# Patient Record
Sex: FEMALE | Race: WHITE | NOT HISPANIC OR LATINO | URBAN - METROPOLITAN AREA
[De-identification: names, ages, dates, MRNs, and addresses within clinical notes are randomized per-mention and may not be internally consistent; named-entity substitution may affect disease eponyms.]

---

## 2021-03-12 ENCOUNTER — HOSPITAL ENCOUNTER (OUTPATIENT)
Dept: LAB | Facility: MEDICAL CENTER | Age: 41
End: 2021-03-12
Attending: PHYSICIAN ASSISTANT
Payer: COMMERCIAL

## 2021-03-12 ENCOUNTER — OFFICE VISIT (OUTPATIENT)
Dept: MEDICAL GROUP | Facility: IMAGING CENTER | Age: 41
End: 2021-03-12
Payer: COMMERCIAL

## 2021-03-12 VITALS
HEART RATE: 74 BPM | BODY MASS INDEX: 22.65 KG/M2 | SYSTOLIC BLOOD PRESSURE: 118 MMHG | OXYGEN SATURATION: 95 % | DIASTOLIC BLOOD PRESSURE: 70 MMHG | WEIGHT: 161.8 LBS | RESPIRATION RATE: 12 BRPM | TEMPERATURE: 98.1 F | HEIGHT: 71 IN

## 2021-03-12 DIAGNOSIS — Z71.89 PRENATAL CONSULT: ICD-10-CM

## 2021-03-12 DIAGNOSIS — Z12.31 ENCOUNTER FOR SCREENING MAMMOGRAM FOR MALIGNANT NEOPLASM OF BREAST: ICD-10-CM

## 2021-03-12 DIAGNOSIS — Z76.89 ENCOUNTER TO ESTABLISH CARE: ICD-10-CM

## 2021-03-12 DIAGNOSIS — Z88.9 H/O SEASONAL ALLERGIES: ICD-10-CM

## 2021-03-12 DIAGNOSIS — E01.0 THYROMEGALY: ICD-10-CM

## 2021-03-12 PROBLEM — Z83.49 FAMILY HISTORY OF HEMOCHROMATOSIS: Status: ACTIVE | Noted: 2018-05-24

## 2021-03-12 LAB
ALBUMIN SERPL BCP-MCNC: 4.4 G/DL (ref 3.2–4.9)
ALBUMIN/GLOB SERPL: 1.6 G/DL
ALP SERPL-CCNC: 76 U/L (ref 30–99)
ALT SERPL-CCNC: 15 U/L (ref 2–50)
ANION GAP SERPL CALC-SCNC: 10 MMOL/L (ref 7–16)
AST SERPL-CCNC: 21 U/L (ref 12–45)
BASOPHILS # BLD AUTO: 0.7 % (ref 0–1.8)
BASOPHILS # BLD: 0.04 K/UL (ref 0–0.12)
BILIRUB SERPL-MCNC: 0.6 MG/DL (ref 0.1–1.5)
BUN SERPL-MCNC: 16 MG/DL (ref 8–22)
CALCIUM SERPL-MCNC: 9.8 MG/DL (ref 8.5–10.5)
CHLORIDE SERPL-SCNC: 105 MMOL/L (ref 96–112)
CHOLEST SERPL-MCNC: 175 MG/DL (ref 100–199)
CO2 SERPL-SCNC: 24 MMOL/L (ref 20–33)
CREAT SERPL-MCNC: 0.58 MG/DL (ref 0.5–1.4)
EOSINOPHIL # BLD AUTO: 0.05 K/UL (ref 0–0.51)
EOSINOPHIL NFR BLD: 0.9 % (ref 0–6.9)
ERYTHROCYTE [DISTWIDTH] IN BLOOD BY AUTOMATED COUNT: 41.4 FL (ref 35.9–50)
EST. AVERAGE GLUCOSE BLD GHB EST-MCNC: 100 MG/DL
FASTING STATUS PATIENT QL REPORTED: NORMAL
GLOBULIN SER CALC-MCNC: 2.7 G/DL (ref 1.9–3.5)
GLUCOSE SERPL-MCNC: 79 MG/DL (ref 65–99)
HBA1C MFR BLD: 5.1 % (ref 4–5.6)
HCT VFR BLD AUTO: 47.5 % (ref 37–47)
HDLC SERPL-MCNC: 83 MG/DL
HGB BLD-MCNC: 15.8 G/DL (ref 12–16)
IMM GRANULOCYTES # BLD AUTO: 0.01 K/UL (ref 0–0.11)
IMM GRANULOCYTES NFR BLD AUTO: 0.2 % (ref 0–0.9)
LDLC SERPL CALC-MCNC: 81 MG/DL
LYMPHOCYTES # BLD AUTO: 1.81 K/UL (ref 1–4.8)
LYMPHOCYTES NFR BLD: 32.1 % (ref 22–41)
MCH RBC QN AUTO: 30.2 PG (ref 27–33)
MCHC RBC AUTO-ENTMCNC: 33.3 G/DL (ref 33.6–35)
MCV RBC AUTO: 90.8 FL (ref 81.4–97.8)
MONOCYTES # BLD AUTO: 0.5 K/UL (ref 0–0.85)
MONOCYTES NFR BLD AUTO: 8.9 % (ref 0–13.4)
NEUTROPHILS # BLD AUTO: 3.23 K/UL (ref 2–7.15)
NEUTROPHILS NFR BLD: 57.2 % (ref 44–72)
NRBC # BLD AUTO: 0 K/UL
NRBC BLD-RTO: 0 /100 WBC
PLATELET # BLD AUTO: 333 K/UL (ref 164–446)
PMV BLD AUTO: 10.1 FL (ref 9–12.9)
POTASSIUM SERPL-SCNC: 4.2 MMOL/L (ref 3.6–5.5)
PROT SERPL-MCNC: 7.1 G/DL (ref 6–8.2)
RBC # BLD AUTO: 5.23 M/UL (ref 4.2–5.4)
SODIUM SERPL-SCNC: 139 MMOL/L (ref 135–145)
TRIGL SERPL-MCNC: 53 MG/DL (ref 0–149)
TSH SERPL DL<=0.005 MIU/L-ACNC: 2.91 UIU/ML (ref 0.38–5.33)
WBC # BLD AUTO: 5.6 K/UL (ref 4.8–10.8)

## 2021-03-12 PROCEDURE — 85025 COMPLETE CBC W/AUTO DIFF WBC: CPT

## 2021-03-12 PROCEDURE — 36415 COLL VENOUS BLD VENIPUNCTURE: CPT

## 2021-03-12 PROCEDURE — 80053 COMPREHEN METABOLIC PANEL: CPT

## 2021-03-12 PROCEDURE — 84443 ASSAY THYROID STIM HORMONE: CPT

## 2021-03-12 PROCEDURE — 80061 LIPID PANEL: CPT

## 2021-03-12 PROCEDURE — 83036 HEMOGLOBIN GLYCOSYLATED A1C: CPT

## 2021-03-12 PROCEDURE — 99204 OFFICE O/P NEW MOD 45 MIN: CPT | Performed by: PHYSICIAN ASSISTANT

## 2021-03-12 RX ORDER — FLUTICASONE PROPIONATE 50 MCG
1 SPRAY, SUSPENSION (ML) NASAL DAILY
Qty: 16 G | Refills: 1 | Status: SHIPPED | OUTPATIENT
Start: 2021-03-12

## 2021-03-12 ASSESSMENT — PATIENT HEALTH QUESTIONNAIRE - PHQ9: CLINICAL INTERPRETATION OF PHQ2 SCORE: 0

## 2021-03-12 ASSESSMENT — PAIN SCALES - GENERAL: PAINLEVEL: NO PAIN

## 2021-03-12 NOTE — PROGRESS NOTES
Subjective:     CC:    Chief Complaint   Patient presents with   • Establish Care   • Seasonal Allergies     constant flare up, can not take zyrtec    • Contraception       HISTORY OF THE PRESENT ILLNESS: Patient is a 40 y.o. female.   H/O seasonal allergies  Chronic post nasal drip, cannot tolerate antihistamines. Has not tried flonase.     Planning on having another child in May 2021. Pt on a prenatal. Interested in seeing a nutritionist prior. Pt has a history of bulimia for 10 years, hasn't purged in the past 4 years.   Using a sperm bank, female partner.     Depression Screening    Little interest or pleasure in doing things?  0 - not at all  Feeling down, depressed , or hopeless? 0 - not at all  Patient Health Questionnaire Score: 0    Allergies:   Patient has no known allergies.  Current Outpatient Medications Ordered in Epic   Medication Sig Dispense Refill   • Prenatal Vit-Fe Fumarate-FA (PRENATAL PO) Take  by mouth.     • fluticasone (FLONASE) 50 MCG/ACT nasal spray Administer 1 Spray into affected nostril(S) every day. 16 g 1     No current [facility]-ordered facility-administered medications on file.     History reviewed. No pertinent past medical history.  Past Surgical History:   Procedure Laterality Date   • TONSILLECTOMY       Social History     Tobacco Use   • Smoking status: Never Smoker   • Smokeless tobacco: Never Used   Substance Use Topics   • Alcohol use: Not Currently   • Drug use: Never     Social History     Social History Narrative   • Not on file     Family History   Problem Relation Age of Onset   • Cancer Mother 41        breast +BRCA   • Diabetes Father    • Hypertension Father    • Obesity Father    • Cancer Maternal Uncle         [facility]       Health Maintenance/Screening  --Diet: balanced, started keto - lost a lot of water weight  --Exercise: 3-4 days a week cardio    Ob-Gyn/ History:    Patient has GYN provider: Dr. Peterson  /Para:  1/1  3/7/21  Last pap smear:   History of  "abnormal pap smears: HPV 15 years ago, LEEP procedure  Current Contraceptive Method:   Currently sexually active: yes  H/O STD: no  Periods regular  Cramping none  Pt BRCA negative - has not had a mammogram yet.    --STI Screening: refused  --HIV Screening: refused  --Immunizations:    Influenza: refused   Tetanus: due 3/30   Moderna covid: completed     ROS:   Gen: no fevers/chills, no changes in weight  Eyes: no changes in vision  ENT: no sore throat, no hearing loss, no bloody nose  Pulm: no sob, no cough  CV: no chest pain, no palpitations  GI: no nausea/vomiting, no diarrhea  : no dysuria or hematuria  MSk: no myalgias  Skin: no rash  Neuro: no headaches, no numbness/tingling  Heme/Lymph: no easy bruising      Objective:     Exam: /70 (BP Location: Left arm, Patient Position: Sitting, BP Cuff Size: Adult)   Pulse 74   Temp 36.7 °C (98.1 °F) (Temporal)   Resp 12   Ht 1.803 m (5' 11\")   Wt 73.4 kg (161 lb 12.8 oz)   SpO2 95%  Body mass index is 22.57 kg/m².  General: Normal appearing. No distress.  HEENT: Normocephalic. Eyes conjunctiva clear lids without ptosis, pupils equal and reactive to light accommodation, ears normal shape and contour, canals are clear bilaterally, tympanic membranes are benign, nasal mucosa benign, oropharynx is without erythema, edema or exudates.   Neck: Supple without JVD or bruit. +thyromegaly  Pulmonary: Clear to ausculation.  Normal effort. No rales, ronchi, or wheezing.  Cardiovascular: Regular rate and rhythm without murmur. Carotid and radial pulses are intact and equal bilaterally.  Abdomen: Soft, nontender, nondistended. Normal bowel sounds. Liver and spleen are not palpable  Neurologic: Grossly nonfocal  Lymph: No cervical or supraclavicular lymph nodes are palpable  Skin: Warm and dry.  No obvious lesions.  Musculoskeletal: Normal gait. No extremity cyanosis, clubbing, or edema.  Psych: Normal mood and affect. Alert and oriented x3. Judgment and insight is " normal.    Assessment & Plan:   40 y.o. female with the following -    1. Encounter to establish care  Pleasant 40-year-old female here to establish care.  See lab orders below.  Diet and exercise discussed.  Vaccinations reviewed.  Up-to-date on Pap smear.  - CBC WITH DIFFERENTIAL; Future  - Comp Metabolic Panel; Future  - TSH WITH REFLEX TO FT4; Future  - HEMOGLOBIN A1C; Future  - Lipid Profile; Future    2. H/O seasonal allergies  Chronic, uncontrolled.  Cannot tolerate antihistamines.  We will start her on Flonase.  - fluticasone (FLONASE) 50 MCG/ACT nasal spray; Administer 1 Spray into affected nostril(S) every day.  Dispense: 16 g; Refill: 1    3. Prenatal consult  See lab orders below.  Continue prenatal.  - CBC WITH DIFFERENTIAL; Future  - Comp Metabolic Panel; Future  - TSH WITH REFLEX TO FT4; Future  - HEMOGLOBIN A1C; Future  - Lipid Profile; Future  - REFERRAL TO NUTRITION SERVICES    4. Encounter for screening mammogram for malignant neoplasm of breast  Patient with family history of breast cancer in her mother at age 41.  Patient has tested BRCA negative.  - MA-SCREENING MAMMO BILAT W/TOMOSYNTHESIS W/CAD; Future    5. Thyromegaly  Noted on exam today.  Thyroid ultrasound for completeness.  - US-THYROID; Future    Return if symptoms worsen or fail to improve.    Sol Caceres PA-C    Please note that this dictation was created using voice recognition software. I have made every reasonable attempt to correct obvious errors, but I expect that there are errors of grammar and possibly content that I did not discover before finalizing the note.

## 2021-04-06 ENCOUNTER — OFFICE VISIT (OUTPATIENT)
Dept: HEALTH INFORMATION MANAGEMENT | Facility: MEDICAL CENTER | Age: 41
End: 2021-04-06
Payer: COMMERCIAL

## 2021-04-06 DIAGNOSIS — Z71.89 PRENATAL CONSULT: ICD-10-CM

## 2021-04-06 PROCEDURE — 97802 MEDICAL NUTRITION INDIV IN: CPT | Performed by: DIETITIAN, REGISTERED

## 2021-04-06 NOTE — PROGRESS NOTES
4/6/2021    Sol Caceres P.A.-C.  40 y.o.   Time in/out: 10:30-11:24 am     Anthropometrics/Objective  There were no vitals filed for this visit.    There is no height or weight on file to calculate BMI.      Stated Goal Weight: 156 lb pre-pregnancy   See comprehensive patient history form for further information     Subjective:  - Planning on having another child May 2021   - Reports significant swelling and subsequent pain as well as pre-eclampsia in previous pregnancy  - Would like to know about dietary interventions to help reduce swelling   - Currently following a lower carbohydrate dietary pattern     Nutrition Diagnosis (PES Statement)  No nutrition diagnosis at this time     Client history:  Condition(s) associated with a diagnosis or treatment (specify): none     Biochemical data, medical test and procedures  Lab Results   Component Value Date/Time    HBA1C 5.1 03/12/2021 09:12 AM   @  No results found for: POCGLUCOSE  Lab Results   Component Value Date/Time    CHOLSTRLTOT 175 03/12/2021 09:12 AM    LDL 81 03/12/2021 09:12 AM    HDL 83 03/12/2021 09:12 AM    TRIGLYCERIDE 53 03/12/2021 09:12 AM         Nutrition Intervention    Meal and Snack  Recommend a general/healthful diet    Comprehensive Nutrition education Instruction or training leading to in-depth nutrition related knowledge about:  - Prenatal nutrition   - Hydration  - Potassium containing foods  - Sodium intake   - Omega-3's  - Benefits of complex carbs   - Calcium rich foods     Monitoring & Evaluation Plan    Behavioral-Environmental:  Food/Nutrition knowledge: Use reputable online nutrition resource  Physical activity: As tolerated   Other: Consider elevating feet as able and use supportive tights or stocking     Food / Nutrient Intake:  Fluid/Beverage intake: Adequate hydration, reduce caffeine intake.   Food intake: Increase potassium, calcium and low mercury omega-3 containing foods. Minimize sodium intake and addition of sodium to  foods. Macronutrients intake: Recommend balanced diet to ensure all nutrient needs are met - including complex carbs, lean proteins, fruits/vegetables, and heart healthy fats.  Other: Continue prenatals.     Physical Signs / Symptoms:  N/a     Assessment Notes:  Pt with questions regarding nutrition interventions for reducing swelling during pregnancy. Recommend increasing potassium containing foods, minimizing sodium intake, reducing caffeine, and getting adequate hydration. For general prenatal and pregnancy nutrition, encouraged meeting RDA for calcium and incorporating low mercury omega-3 foods, choline rich foods (eggs with yolk) and complex slow digesting carbs. Recommended Real Food Pregnancy book. Encourage pt to call with any further questions or concerns.    Swati Rodriguez RD,LD  [facility]  [phone]

## 2021-04-27 ENCOUNTER — HOSPITAL ENCOUNTER (OUTPATIENT)
Dept: RADIOLOGY | Facility: MEDICAL CENTER | Age: 41
End: 2021-04-27
Attending: PHYSICIAN ASSISTANT
Payer: COMMERCIAL

## 2021-04-27 DIAGNOSIS — E01.0 THYROMEGALY: ICD-10-CM

## 2021-04-27 DIAGNOSIS — Z12.31 ENCOUNTER FOR SCREENING MAMMOGRAM FOR MALIGNANT NEOPLASM OF BREAST: ICD-10-CM

## 2021-04-27 PROCEDURE — 76536 US EXAM OF HEAD AND NECK: CPT

## 2021-04-27 PROCEDURE — 77063 BREAST TOMOSYNTHESIS BI: CPT

## 2021-05-02 DIAGNOSIS — E07.9 THYROID MASS: ICD-10-CM

## 2021-05-02 NOTE — PROGRESS NOTES
Pt with right mid thyroid mass >1 cm. Will order FNA. If negative, repeat thyroid ultrasound in 1 year.     Sol Ayala PA-C ([place])  Physician Assistant Certified  [facility]

## 2021-05-11 ENCOUNTER — HOSPITAL ENCOUNTER (OUTPATIENT)
Dept: RADIOLOGY | Facility: MEDICAL CENTER | Age: 41
End: 2021-05-11
Attending: PHYSICIAN ASSISTANT
Payer: COMMERCIAL

## 2021-05-11 DIAGNOSIS — E07.9 THYROID MASS: ICD-10-CM

## 2021-05-11 PROCEDURE — 88112 CYTOPATH CELL ENHANCE TECH: CPT

## 2021-05-11 PROCEDURE — 10005 FNA BX W/US GDN 1ST LES: CPT

## 2021-05-11 NOTE — PROGRESS NOTES
Pt underwent an US guided right thyroid FNA performed by Dr Garcia. Pt remained hemodynamically stable throughout the procedure. No adverse reactions detected. Bandaid to procedure site cdi. All questions answered prior to discharge. Pt discharged home in stable condition.

## 2021-05-12 LAB — CYTOLOGY REG CYTOL: NORMAL

## 2021-06-10 ENCOUNTER — OFFICE VISIT (OUTPATIENT)
Dept: MEDICAL GROUP | Facility: IMAGING CENTER | Age: 41
End: 2021-06-10
Payer: COMMERCIAL

## 2021-06-10 VITALS
TEMPERATURE: 97.2 F | BODY MASS INDEX: 22.23 KG/M2 | DIASTOLIC BLOOD PRESSURE: 68 MMHG | HEART RATE: 67 BPM | HEIGHT: 71 IN | WEIGHT: 158.8 LBS | SYSTOLIC BLOOD PRESSURE: 110 MMHG | RESPIRATION RATE: 12 BRPM | OXYGEN SATURATION: 99 %

## 2021-06-10 DIAGNOSIS — H53.8 BLURRED VISION, BILATERAL: ICD-10-CM

## 2021-06-10 DIAGNOSIS — M54.6 ACUTE RIGHT-SIDED THORACIC BACK PAIN: ICD-10-CM

## 2021-06-10 DIAGNOSIS — Z32.02 URINE PREGNANCY TEST NEGATIVE: ICD-10-CM

## 2021-06-10 PROBLEM — M54.9 BACK PAIN: Status: ACTIVE | Noted: 2021-06-10

## 2021-06-10 LAB
INT CON NEG: NORMAL
INT CON POS: NORMAL
POC URINE PREGNANCY TEST: NEGATIVE

## 2021-06-10 PROCEDURE — 81025 URINE PREGNANCY TEST: CPT | Performed by: PHYSICIAN ASSISTANT

## 2021-06-10 PROCEDURE — 99214 OFFICE O/P EST MOD 30 MIN: CPT | Performed by: PHYSICIAN ASSISTANT

## 2021-06-10 ASSESSMENT — PAIN SCALES - GENERAL: PAINLEVEL: 2=MINIMAL-SLIGHT

## 2021-06-10 ASSESSMENT — FIBROSIS 4 INDEX: FIB4 SCORE: 0.62

## 2021-06-10 NOTE — ASSESSMENT & PLAN NOTE
Pt admits to slowly developing blurred vision over the past two years. She has not seen an ophthalmologist and would like a referral. No floaters or changes in field of vision. No eye pain or color vision loss. Diabetes screening negative.

## 2021-06-10 NOTE — PATIENT INSTRUCTIONS
Suprascapular Nerve Entrapment  Suprascapular nerve entrapment is pressure or squeezing (entrapment) of the suprascapular nerve. This nerve provides feeling to the muscles near the shoulder blade (scapula) and shoulder joint. This nerve begins in the spinal cord in the neck and passes down the front of the neck, behind the collarbone (clavicle), and over the back of the scapula. Nerve entrapment can happen anywhere along the nerve, but it commonly happens:  · In the neck, near where the nerve leaves the spinal cord.  · At the top of the scapula, under a band of tissue that connects bones to each other (ligament).  · Behind the scapula, where the nerve passes through a narrow area.  This condition is common among athletes who often raise their arms overhead and rotate their shoulders outward, which is why it is sometimes called volleyball shoulder. Suprascapular nerve entrapment can lead to nerve damage in the shoulder (neuropathy).  What are the causes?    This condition is commonly caused by narrowing of the tissues around the suprascapular nerve. This may happen gradually from repeatedly making overhead arm motions or frequently carrying a heavy backpack. It may also result from a sudden (acute) injury such as a fall or getting hit in the area where the nerve is.  Less commonly, this condition may be caused by a fluid-filled lump growing near the nerve (ganglion cyst) that causes the nerve to swell.  What increases the risk?  You are more likely to develop this condition if:  · You are a young, male athlete.  · You participate in sports that involve overhead arm movements. These sports include:  ? Volleyball.  ? Baseball.  ? Tennis.  ? Weight lifting.  What are the signs or symptoms?  Symptoms of this condition include:  · Pain. This may feel like a dull ache, and it may get worse with overhead arm movements.  · Weakness.  · Decreased range of motion.  How is this diagnosed?  This condition is diagnosed based  on:  · Your symptoms.  · Your medical history, including your history of recent injuries.  · A physical exam to test your muscle strength and range of motion.  · Your body's response to a shot (injection) of numbing medicine in your shoulder. This may be done to see if the medicine helps relieve your shoulder pain.  · Tests, such as:  ? Electromyogram (EMG). This is an electrical nerve study that is used to find out which nerve and muscles are affected.  ? X-rays.  ? MRI.  ? Ultrasound. This test uses sound waves to take pictures of the inside of the body.  How is this treated?  Treatment for this condition may include:  · Avoiding activities that cause pain.  · Taking medicines to help relieve pain.  · Doing physical therapy.  · Having one or more injections of a numbing medicine (steroid) to help reduce inflammation and pain.  · Having surgery to remove a ganglion cyst or enlarge a narrow area. Surgery may be needed if your condition does not improve after trying other treatment methods.  Follow these instructions at home:  Medicines  · Take over-the-counter and prescription medicines only as told by your health care provider.  · Ask your health care provider if the medicine prescribed to you requires you to avoid driving or using heavy machinery.  Activity  · Return to your normal activities as told by your health care provider. Ask your health care provider what activities are safe for you.  · Do not lift anything that is heavier than 10 lb (4.5 kg), or the limit that you are told, until your health care provider says that it is safe.  · Ask your health care provider when it is safe for you to drive.  · Do exercises as told by your health care provider.  General instructions  · Do not use any products that contain nicotine or tobacco, such as cigarettes, e-cigarettes, and chewing tobacco. These can delay healing. If you need help quitting, ask your health care provider.  · Keep all follow-up visits as told by  your health care provider. This is important.  How is this prevented?  · Warm up and stretch before being active.  · Cool down and stretch after being active.  · Give your body time to rest between periods of activity.  · Be safe and responsible while being active. This will help you to avoid falls.  · Maintain physical fitness, including strength and flexibility.  Contact a health care provider if:  · You have pain that gets worse or does not get better with medicine.  · Your symptoms get worse or do not go away after 6 months of treatment.  Summary  · Suprascapular nerve entrapment is pressure or squeezing (entrapment) of the nerve that provides feeling to the muscles near the shoulder blade and shoulder joint.  · This condition is common among athletes who often raise their arms overhead and rotate their shoulders outward.  · This condition may be caused by overuse, sudden injury, or the presence of a fluid-filled lump growing near the nerve (ganglion cyst).  · Treatment may include rest, medicines, physical therapy, and surgery if needed.  This information is not intended to replace advice given to you by your health care provider. Make sure you discuss any questions you have with your health care provider.  Document Released: 12/18/2006 Document Revised: 02/24/2020 Document Reviewed: 02/26/2020  Elsevier Patient Education © 2020 [employer]

## 2021-06-10 NOTE — ASSESSMENT & PLAN NOTE
Pt admits to pain under her right scapula x several months. It originally started when she woke up one morning. She thought that it was just due to how she was sleeping and expected it to go away shortly thereafter. It is a sharp pain that intermittently shoots up to her right lateral neck/shoulder. Pt admits to tenderness, especially when she lays on that side. She does state that she lifts her daughters diaper bag with her right arm and does it several times a day. Pt has tried a heating pad without relief. No NSAIDs or Tylenol. No weakness. No sob or CP. No skin changes.

## 2021-06-10 NOTE — PROGRESS NOTES
Subjective:     CC:   Chief Complaint   Patient presents with   • Back Pain     woke up with sharp pain few months ago    • Arm Pain     right side    • Difficulty Sleeping     from pain       HPI:   Christa presents today with    Back pain  Pt admits to pain under her right scapula x several months. It originally started when she woke up one morning. She thought that it was just due to how she was sleeping and expected it to go away shortly thereafter. It is a sharp pain that intermittently shoots up to her right lateral neck/shoulder. Pt admits to tenderness, especially when she lays on that side. She does state that she lifts her daughters diaper bag with her right arm and does it several times a day. Pt has tried a heating pad without relief. No NSAIDs or Tylenol. No weakness. No sob or CP. No skin changes.     Blurred vision, bilateral  Pt admits to slowly developing blurred vision over the past two years. She has not seen an ophthalmologist and would like a referral. No floaters or changes in field of vision. No eye pain or color vision loss. Diabetes screening negative.       History reviewed. No pertinent past medical history.  Social History     Tobacco Use   • Smoking status: Never Smoker   • Smokeless tobacco: Never Used   Vaping Use   • Vaping Use: Never used   Substance Use Topics   • Alcohol use: Not Currently   • Drug use: Never     Current Outpatient Medications Ordered in Epic   Medication Sig Dispense Refill   • Prenatal Vit-Fe Fumarate-FA (PRENATAL PO) Take  by mouth.     • fluticasone (FLONASE) 50 MCG/ACT nasal spray Administer 1 Spray into affected nostril(S) every day. 16 g 1     No current [facility]-ordered facility-administered medications on file.     Patient has no known allergies.    Health Maintenance: Completed    ROS:  Gen: no fevers/chills, no changes in weight  Eyes: no changes in vision  Pulm: no sob, no cough  CV: no chest pain, no palpitations, no edema  GI: no nausea/vomiting, no  "diarrhea  Skin: no rash, no lesions  Neuro: no headaches, no numbness/tingling  Heme/Lymph: no easy bruising or bleeding    Objective:   Exam:  /68 (BP Location: Left arm, Patient Position: Sitting, BP Cuff Size: Adult)   Pulse 67   Temp 36.2 °C (97.2 °F) (Temporal)   Resp 12   Ht 1.803 m (5' 11\")   Wt 72 kg (158 lb 12.8 oz)   LMP 05/20/2021   SpO2 99%   BMI 22.15 kg/m²    Body mass index is 22.15 kg/m².    Gen: Alert and oriented, No apparent distress.  HEENT: Head atraumatic, normocephalic. Pupils equal and round.  Neck: Neck is supple without lymphadenopathy.   Lungs: Normal effort, CTA bilaterally, no wheezes, rhonchi, or rales  CV: Regular rate and rhythm. No murmurs, rubs, or gallops.  MSK: No vertebral tenderness or paraspinous tenderness. Skin warm, dry, no rashes or lesions. No tenderness noted along scapula or associated musculature. Full ROM B/L UE. Strength 5/5. No scapular winging.   Ext: No clubbing, cyanosis, edema.    Assessment & Plan:     40 y.o. female with the following -     1. Acute right-sided thoracic back pain  Suspect suprascapular nerve impingement.  Would recommend Aleve twice a day for 1 week and ice pack alternating with heat.  Avoid heavy lifting on that right side.  If no change over the next week would recommend that patient be evaluated by physical therapy.  - REFERRAL TO PHYSICAL THERAPY    2. Urine pregnancy test negative  - POC URINE PREGNANCY    3. Blurred vision, bilateral  Chronic, no alarm symptoms.  - REFERRAL TO OPHTHALMOLOGY    Return if symptoms worsen or fail to improve.    Sol Ayala PA-C ([place])  Physician Assistant Certified  [facility]    Please note that this dictation was created using voice recognition software. I have made every reasonable attempt to correct obvious errors, but I expect that there are errors of grammar and possibly content that I did not discover before finalizing the note.  "

## 2021-07-20 ENCOUNTER — HOSPITAL ENCOUNTER (OUTPATIENT)
Facility: MEDICAL CENTER | Age: 41
End: 2021-07-20
Attending: OBSTETRICS & GYNECOLOGY
Payer: COMMERCIAL

## 2021-07-20 PROCEDURE — 87340 HEPATITIS B SURFACE AG IA: CPT

## 2021-07-20 PROCEDURE — 87389 HIV-1 AG W/HIV-1&-2 AB AG IA: CPT

## 2021-07-20 PROCEDURE — 86850 RBC ANTIBODY SCREEN: CPT

## 2021-07-20 PROCEDURE — 85025 COMPLETE CBC W/AUTO DIFF WBC: CPT

## 2021-07-20 PROCEDURE — 86780 TREPONEMA PALLIDUM: CPT

## 2021-07-20 PROCEDURE — 86762 RUBELLA ANTIBODY: CPT

## 2021-07-20 PROCEDURE — 87086 URINE CULTURE/COLONY COUNT: CPT

## 2021-07-20 PROCEDURE — 86900 BLOOD TYPING SEROLOGIC ABO: CPT

## 2021-07-20 PROCEDURE — 86901 BLOOD TYPING SEROLOGIC RH(D): CPT

## 2021-07-20 PROCEDURE — 86803 HEPATITIS C AB TEST: CPT

## 2021-07-20 PROCEDURE — 86787 VARICELLA-ZOSTER ANTIBODY: CPT

## 2021-07-21 LAB
ABO GROUP BLD: NORMAL
BASOPHILS # BLD AUTO: 0.2 % (ref 0–1.8)
BASOPHILS # BLD: 0.02 K/UL (ref 0–0.12)
BLD GP AB SCN SERPL QL: NORMAL
EOSINOPHIL # BLD AUTO: 0.05 K/UL (ref 0–0.51)
EOSINOPHIL NFR BLD: 0.5 % (ref 0–6.9)
ERYTHROCYTE [DISTWIDTH] IN BLOOD BY AUTOMATED COUNT: 43.7 FL (ref 35.9–50)
HBV SURFACE AG SER QL: ABNORMAL
HCT VFR BLD AUTO: 43.4 % (ref 37–47)
HCV AB SER QL: NORMAL
HGB BLD-MCNC: 14.4 G/DL (ref 12–16)
HIV 1+2 AB+HIV1 P24 AG SERPL QL IA: NORMAL
IMM GRANULOCYTES # BLD AUTO: 0.05 K/UL (ref 0–0.11)
IMM GRANULOCYTES NFR BLD AUTO: 0.5 % (ref 0–0.9)
LYMPHOCYTES # BLD AUTO: 1.95 K/UL (ref 1–4.8)
LYMPHOCYTES NFR BLD: 20.3 % (ref 22–41)
MCH RBC QN AUTO: 30.9 PG (ref 27–33)
MCHC RBC AUTO-ENTMCNC: 33.2 G/DL (ref 33.6–35)
MCV RBC AUTO: 93.1 FL (ref 81.4–97.8)
MONOCYTES # BLD AUTO: 0.49 K/UL (ref 0–0.85)
MONOCYTES NFR BLD AUTO: 5.1 % (ref 0–13.4)
NEUTROPHILS # BLD AUTO: 7.04 K/UL (ref 2–7.15)
NEUTROPHILS NFR BLD: 73.4 % (ref 44–72)
NRBC # BLD AUTO: 0 K/UL
NRBC BLD-RTO: 0 /100 WBC
PLATELET # BLD AUTO: 284 K/UL (ref 164–446)
PMV BLD AUTO: 9.6 FL (ref 9–12.9)
RBC # BLD AUTO: 4.66 M/UL (ref 4.2–5.4)
RH BLD: NORMAL
RUBV AB SER QL: 39.1 IU/ML
TREPONEMA PALLIDUM IGG+IGM AB [PRESENCE] IN SERUM OR PLASMA BY IMMUNOASSAY: ABNORMAL
WBC # BLD AUTO: 9.6 K/UL (ref 4.8–10.8)

## 2021-07-22 LAB
VZV IGG SER IA-ACNC: 1181 IV
VZV IGM SER IA-ACNC: 0.16 ISR

## 2021-07-23 LAB
BACTERIA UR CULT: NORMAL
SIGNIFICANT IND 70042: NORMAL
SITE SITE: NORMAL
SOURCE SOURCE: NORMAL

## 2021-08-31 ENCOUNTER — HOSPITAL ENCOUNTER (OUTPATIENT)
Facility: MEDICAL CENTER | Age: 41
End: 2021-08-31
Attending: PHYSICIAN ASSISTANT
Payer: COMMERCIAL

## 2021-08-31 ENCOUNTER — OFFICE VISIT (OUTPATIENT)
Dept: URGENT CARE | Facility: CLINIC | Age: 41
End: 2021-08-31
Payer: COMMERCIAL

## 2021-08-31 VITALS
SYSTOLIC BLOOD PRESSURE: 104 MMHG | WEIGHT: 169 LBS | DIASTOLIC BLOOD PRESSURE: 62 MMHG | RESPIRATION RATE: 20 BRPM | OXYGEN SATURATION: 97 % | TEMPERATURE: 97.9 F | BODY MASS INDEX: 23.66 KG/M2 | HEART RATE: 98 BPM | HEIGHT: 71 IN

## 2021-08-31 DIAGNOSIS — R09.81 SINUS CONGESTION: ICD-10-CM

## 2021-08-31 DIAGNOSIS — J98.8 VIRAL RESPIRATORY ILLNESS: ICD-10-CM

## 2021-08-31 DIAGNOSIS — B97.89 VIRAL RESPIRATORY ILLNESS: ICD-10-CM

## 2021-08-31 PROCEDURE — 99213 OFFICE O/P EST LOW 20 MIN: CPT | Performed by: PHYSICIAN ASSISTANT

## 2021-08-31 PROCEDURE — U0005 INFEC AGEN DETEC AMPLI PROBE: HCPCS

## 2021-08-31 PROCEDURE — U0003 INFECTIOUS AGENT DETECTION BY NUCLEIC ACID (DNA OR RNA); SEVERE ACUTE RESPIRATORY SYNDROME CORONAVIRUS 2 (SARS-COV-2) (CORONAVIRUS DISEASE [COVID-19]), AMPLIFIED PROBE TECHNIQUE, MAKING USE OF HIGH THROUGHPUT TECHNOLOGIES AS DESCRIBED BY CMS-2020-01-R: HCPCS

## 2021-08-31 RX ORDER — DIPHENHYDRAMINE HCL 50 MG
50 CAPSULE ORAL EVERY 6 HOURS PRN
COMMUNITY

## 2021-08-31 RX ORDER — ACETAMINOPHEN 325 MG/1
650 TABLET ORAL EVERY 4 HOURS PRN
COMMUNITY

## 2021-08-31 ASSESSMENT — ENCOUNTER SYMPTOMS
VOMITING: 0
EYE DISCHARGE: 0
HEADACHES: 1
SORE THROAT: 0
SHORTNESS OF BREATH: 0
FEVER: 0
COUGH: 1
EYE REDNESS: 0
NAUSEA: 0
SINUS PAIN: 1

## 2021-08-31 ASSESSMENT — FIBROSIS 4 INDEX: FIB4 SCORE: 0.73

## 2021-08-31 NOTE — LETTER
August 31, 2021         Patient: Christa Lenz   YOB: 1980   Date of Visit: 8/31/2021       To Whom it May Concern,     Your employee was seen in our clinic today.  A concern for COVID-19 has been identified and testing is in progress.      We are asking you to excuse absences while following self-isolation protocol per Center for Disease Control (CDC) guidelines.  Your employee will be able to access test results through our electronic delivery system called [employer].      If the results of testing are negative, and once there has been no fever (temperature >100.4 F) for at least 72 hours without treatment, and no vomiting or diarrhea for at least 48 hours, then return to work is approved.       If the results of testing are positive then your employee will be contacted by the [facility] or [facility] department for further instructions on duration of self-isolation and return to work protocol. In general, this will also follow the CDC guidelines with a minimum of 10 days from the onset of symptoms and without fever, vomiting, or diarrhea as above.      In general, repeat testing is not necessary and not offered through our [facility].      This is the only note that will be provided from [facility] for this visit.  Your employee will require an appointment with a primary care provider if FMLA or disability forms are required.       Sincerely,    Leigh Medeiros P.A.-C.  Electronically Signed

## 2021-09-01 DIAGNOSIS — B97.89 VIRAL RESPIRATORY ILLNESS: ICD-10-CM

## 2021-09-01 DIAGNOSIS — J98.8 VIRAL RESPIRATORY ILLNESS: ICD-10-CM

## 2021-09-01 LAB — COVID ORDER STATUS COVID19: NORMAL

## 2021-09-01 NOTE — PROGRESS NOTES
Subjective     Christa Lenz is a 40 y.o. female who presents with Sinus Pain (sneezing, sore throat, sinus pain, ear pain, worsening sx  x 1-2 days)            URI   This is a new problem. Episode onset: x 1-2 days ago. The problem has been unchanged. Maximum temperature: The patient reports a possible fever. She reports no measured fever. Associated symptoms include congestion, coughing (The patient reports a slight intermittent cough.), headaches, a plugged ear sensation and sinus pain. Pertinent negatives include no chest pain, ear pain, joint pain, nausea, rash, sore throat or vomiting. She has tried acetaminophen for the symptoms.     The patient reports no known exposure to COVID-19.  The patient states her daughter was sick with similar symptoms, and believes she brought home an illness from .  The patient is fully vaccinated against COVID-19.    The patient states she is currently pregnant.    PMH:  has no past medical history on file.  MEDS:   Current Outpatient Medications:   •  diphenhydrAMINE (BENADRYL) 50 MG Cap, Take 50 mg by mouth every 6 hours as needed., Disp: , Rfl:   •  guaiFENesin (ROBITUSSIN) 100 MG/5ML Solution, Take 10 mL by mouth every four hours as needed., Disp: , Rfl:   •  acetaminophen (TYLENOL) 325 MG Tab, Take 650 mg by mouth every four hours as needed., Disp: , Rfl:   •  Prenatal Vit-Fe Fumarate-FA (PRENATAL PO), Take  by mouth., Disp: , Rfl:   •  fluticasone (FLONASE) 50 MCG/ACT nasal spray, Administer 1 Spray into affected nostril(S) every day. (Patient not taking: Reported on 8/31/2021), Disp: 16 g, Rfl: 1  ALLERGIES: No Known Allergies  SURGHX:   Past Surgical History:   Procedure Laterality Date   • TONSILLECTOMY       SOCHX:  reports that she has never smoked. She has never used smokeless tobacco. She reports previous alcohol use. She reports that she does not use drugs.  FH: Family history was reviewed, no pertinent findings to report      Review of Systems  "  Constitutional: Negative for fever.   HENT: Positive for congestion and sinus pain. Negative for ear pain and sore throat.    Eyes: Negative for discharge and redness.   Respiratory: Positive for cough (The patient reports a slight intermittent cough.). Negative for shortness of breath.    Cardiovascular: Negative for chest pain and leg swelling.   Gastrointestinal: Negative for nausea and vomiting.   Musculoskeletal: Negative for joint pain.   Skin: Negative for rash.   Neurological: Positive for headaches.              Objective     /62 (BP Location: Left arm, Patient Position: Sitting, BP Cuff Size: Adult)   Pulse 98   Temp 36.6 °C (97.9 °F) (Temporal)   Resp 20   Ht 1.803 m (5' 11\")   Wt 76.7 kg (169 lb)   LMP 05/20/2021   SpO2 97%   BMI 23.57 kg/m²      Physical Exam  Constitutional:       General: She is not in acute distress.     Appearance: Normal appearance. She is not ill-appearing.   HENT:      Head: Normocephalic and atraumatic.      Right Ear: Tympanic membrane, ear canal and external ear normal.      Left Ear: Tympanic membrane, ear canal and external ear normal.      Nose: Mucosal edema present.      Right Turbinates: Swollen.      Left Turbinates: Swollen.      Mouth/Throat:      Mouth: Mucous membranes are moist.      Pharynx: Oropharynx is clear. No posterior oropharyngeal erythema.   Eyes:      Extraocular Movements: Extraocular movements intact.      Conjunctiva/sclera: Conjunctivae normal.   Cardiovascular:      Rate and Rhythm: Normal rate and regular rhythm.      Heart sounds: Normal heart sounds.   Pulmonary:      Effort: Pulmonary effort is normal. No respiratory distress.      Breath sounds: Normal breath sounds. No wheezing.   Musculoskeletal:         General: Normal range of motion.      Cervical back: Normal range of motion and neck supple.   Skin:     General: Skin is warm and dry.   Neurological:      Mental Status: She is alert and oriented to person, place, and time. "               Progress:  COVID-19 PCR - pending                Assessment & Plan          1. Viral respiratory illness  - SARS-CoV-2 PCR (24 hour In-House): Collect [facility] swab in [facility]; Future    2. Sinus congestion    The patient's presenting symptoms and physical exam endings are consistent with a viral respiratory illness with associated sinus congestion.  The patient's physical exam today in clinic was normal with the exception of a swollen nasal turbinates and mucosal edema of the nasal passages.  The patient's lungs were clear to auscultation without wheezing, and her pulse ox was within normal limits.  The the patient appears in no acute distress.  Tbe patient's vital signs are stable and within normal limits.  She is afebrile today in clinic.  Discussed likely viral etiology with the patient.  Based on patient's presenting symptoms, will rule out COVID-19.  Advised patient to stay at home under self quarantine per CDC guidelines.  Recommend OTC medications and supportive care for symptomatic management.  Recommend patient follow-up with PCP as needed.  Discussed return precautions with patient, and she verbalized understanding.    Differential diagnoses, supportive care, and indications for immediate follow-up discussed with patient.   Instructed to return to clinic or nearest emergency department for any change in condition, further concerns, or worsening of symptoms.    OTC Tylenol or Motrin for fever/discomfort.  OTC cough/cold medication for symptomatic relief  OTC Supportive Care for Congestion - saline nasal spray or neti pot  Drink plenty of fluids  Advised the patient to stay at home under self-isolation until symptoms have been present for at least 10 days and are improved, and there has been no fever for at least 72 hours without the use of medications and/or no vomiting or diarrhea for 48 hours.  Follow-up with PCP  Return to clinic or go to the ED if symptoms worsen or fail to improve, or if the  patient should develop worsening/increasing cough, congestion, ear pain, sore throat, shortness of breath, wheezing, chest pain, fever/chills, and/or any concerning symptoms.    Discussed plan with the patient, and she agrees to the above.     I personally reviewed prior external notes and test results pertinent to today's visit.  I have independently reviewed and interpreted all diagnostics ordered during this urgent care visit.     Time spent evaluating this patient was at least 30 minutes and includes preparing for visit, obtaining history, exam and evaluation, ordering labs/tests/procedures/medications, independent interpretation, and counseling/education.    Please note that this dictation was created using voice recognition software. I have made every reasonable attempt to correct obvious errors, but I expect that there may be errors of grammar and possibly content that I did not discover before finalizing the note.     This note was electronically signed by Leigh Medeiros PA-C

## 2021-09-02 LAB
SARS-COV-2 RNA RESP QL NAA+PROBE: NOTDETECTED
SPECIMEN SOURCE: NORMAL

## 2021-09-17 ENCOUNTER — HOSPITAL ENCOUNTER (OUTPATIENT)
Facility: MEDICAL CENTER | Age: 41
End: 2021-09-17
Attending: OBSTETRICS & GYNECOLOGY
Payer: COMMERCIAL

## 2021-09-17 PROCEDURE — 82105 ALPHA-FETOPROTEIN SERUM: CPT

## 2021-10-13 LAB
# FETUSES US: NORMAL
AFP MOM SERPL: 1.22
AFP SERPL-MCNC: 45 NG/ML
AGE - REPORTED: 39.2 YR
CURRENT SMOKER: NO
FAMILY MEMBER DISEASES HX: NO
GA METHOD: NORMAL
GA: NORMAL WK
IDDM PATIENT QL: NO
INTEGRATED SCN PATIENT-IMP: NORMAL
SPECIMEN DRAWN SERPL: NORMAL

## 2022-10-20 ENCOUNTER — TELEPHONE (OUTPATIENT)
Dept: SCHEDULING | Facility: IMAGING CENTER | Age: 42
End: 2022-10-20

## 2022-10-20 NOTE — TELEPHONE ENCOUNTER
Outcome: Left Voice Message    Please scheduled PT for an Annual PE or a FV with PCP if PT calls back    Attempt #1